# Patient Record
Sex: FEMALE | URBAN - METROPOLITAN AREA
[De-identification: names, ages, dates, MRNs, and addresses within clinical notes are randomized per-mention and may not be internally consistent; named-entity substitution may affect disease eponyms.]

---

## 2022-07-14 ENCOUNTER — TELEPHONE (OUTPATIENT)
Dept: BARIATRICS/WEIGHT MGMT | Age: 57
End: 2022-07-14

## 2022-07-14 NOTE — TELEPHONE ENCOUNTER
Self referral to weight loss office called to see if followed up with pcp ortho for knee pain and if we can obtain records for billable dx.  No answer left message to return call if still interested in program .

## 2023-09-12 ENCOUNTER — HOSPITAL ENCOUNTER (OUTPATIENT)
Dept: DATA CONVERSION | Facility: HOSPITAL | Age: 58
Discharge: HOME | End: 2023-09-12

## 2023-09-12 DIAGNOSIS — Z01.419 ENCOUNTER FOR GYNECOLOGICAL EXAMINATION (GENERAL) (ROUTINE) WITHOUT ABNORMAL FINDINGS: ICD-10-CM

## 2023-11-15 ENCOUNTER — APPOINTMENT (OUTPATIENT)
Dept: PRIMARY CARE | Facility: CLINIC | Age: 58
End: 2023-11-15
Payer: COMMERCIAL

## 2024-12-21 ENCOUNTER — OFFICE VISIT (OUTPATIENT)
Dept: URGENT CARE | Age: 59
End: 2024-12-21
Payer: COMMERCIAL

## 2024-12-21 VITALS
SYSTOLIC BLOOD PRESSURE: 135 MMHG | DIASTOLIC BLOOD PRESSURE: 86 MMHG | TEMPERATURE: 98.1 F | WEIGHT: 185.63 LBS | HEART RATE: 85 BPM | RESPIRATION RATE: 18 BRPM | OXYGEN SATURATION: 97 % | BODY MASS INDEX: 29.07 KG/M2

## 2024-12-21 DIAGNOSIS — Z20.822 SUSPECTED COVID-19 VIRUS INFECTION: ICD-10-CM

## 2024-12-21 DIAGNOSIS — J04.0 LARYNGITIS: Primary | ICD-10-CM

## 2024-12-21 DIAGNOSIS — R68.89 FLU-LIKE SYMPTOMS: ICD-10-CM

## 2024-12-21 LAB
POC RAPID INFLUENZA A: NEGATIVE
POC RAPID INFLUENZA B: NEGATIVE
POC RAPID STREP: NEGATIVE
POC SARS-COV-2 AG BINAX: NORMAL

## 2024-12-21 RX ORDER — PHENTERMINE HYDROCHLORIDE 37.5 MG/1
37.5 CAPSULE ORAL
COMMUNITY
Start: 2024-09-30 | End: 2024-12-29

## 2024-12-21 ASSESSMENT — ENCOUNTER SYMPTOMS
FEVER: 0
VOICE CHANGE: 1
VOMITING: 0
COUGH: 0
DIARRHEA: 0
FATIGUE: 0
DIZZINESS: 0
FLANK PAIN: 0
SINUS PRESSURE: 0
ABDOMINAL PAIN: 0
CHEST TIGHTNESS: 0
CHILLS: 0
SHORTNESS OF BREATH: 0
SORE THROAT: 0

## 2024-12-21 ASSESSMENT — VISUAL ACUITY: OU: 1

## 2024-12-21 NOTE — PATIENT INSTRUCTIONS
Discharge instructions    Please follow up with your Primary Care Physician within the next 5-7 days.    Likely acute viral laryngitis.  Would recommend warm salt water gargles.  If does not improve over the next week please follow-up with your primary care physician as above.  Low risk for oropharyngeal cancer without smoking. Your COVID, flu, strep testing is negative.    It is important to take prescriptions as prescribed and complete all antibiotics.     If your symptoms worsen you are instructed to immediately go to the emergency room for reevaluation and further assessment.    If you develop any chest pain, SOB, or difficulty breathing you are instructed to go to the emergency room for reevaluation.    All discharge instructions will be provided and explained to the patient at discharge.    If you have any questions regarding your treatment plan please call the [facility] urgent care clinic.

## 2024-12-21 NOTE — PROGRESS NOTES
"Subjective   Patient ID: Denise Wasserman is a 59 y.o. female. They present today with a chief complaint of Illness (Losing voice, 5 days ago getting worse. ).    History of Present Illness  Patient is a 59-year-old female presenting to the clinic with complaints of \"laryngitis\".  Patient states she has had symptoms of laryngitis for the last 4 to 5 days.  Patient denies sore throat.  Patient denies chest pain.  Patient denies shortness of breath.  Patient denies nasal congestion or sinus pressure.  Patient denies ear pain or ear pressure.  Patient states there is mild intermittent dry cough every once in a while.  Otherwise her main complaint is laryngitis.  No other acute ROS at this time.  Patient states she is meeting her family tomorrow for [place].  Patient would like to make sure she does not have anything significantly contagious.      History provided by:  Patient  Illness  Associated symptoms: no abdominal pain, no chest pain, no congestion, no cough, no diarrhea, no ear pain, no fatigue, no fever, no shortness of breath, no sore throat and no vomiting        Past Medical History  Allergies as of 12/21/2024    (No Known Allergies)       (Not in a hospital admission)       No past medical history on file.    Past Surgical History:   Procedure Laterality Date    BI US GUIDED BREAST LOCALIZATION AND BIOPSY LEFT Left 3/13/2014    BI US GUIDED BREAST LOCALIZATION AND BIOPSY LEFT [facility]            Review of Systems  Review of Systems   Constitutional:  Negative for chills, fatigue and fever.   HENT:  Positive for voice change. Negative for congestion, ear discharge, ear pain, postnasal drip, sinus pressure and sore throat.    Respiratory:  Negative for cough, chest tightness and shortness of breath.    Cardiovascular:  Negative for chest pain.   Gastrointestinal:  Negative for abdominal pain, diarrhea and vomiting.   Genitourinary:  Negative for flank pain.   Neurological:  Negative for " dizziness.                                  Objective    Vitals:    12/21/24 1225   BP: 135/86   BP Location: Left arm   Patient Position: Sitting   BP Cuff Size: Adult   Pulse: 85   Resp: 18   Temp: 36.7 °C (98.1 °F)   TempSrc: Oral   SpO2: 97%   Weight: 84.2 kg (185 lb 10 oz)     No LMP recorded. Patient is postmenopausal.    Physical Exam  Vitals reviewed.   Constitutional:       General: She is awake. She is not in acute distress.     Appearance: Normal appearance. She is well-developed. She is not ill-appearing or toxic-appearing.   HENT:      Head: Normocephalic and atraumatic.      Right Ear: Tympanic membrane, ear canal and external ear normal.      Left Ear: Tympanic membrane, ear canal and external ear normal.      Nose: No congestion.      Mouth/Throat:      Mouth: Mucous membranes are moist.      Pharynx: Oropharynx is clear. Posterior oropharyngeal erythema present. No pharyngeal swelling, oropharyngeal exudate or uvula swelling.      Tonsils: No tonsillar exudate or tonsillar abscesses.   Eyes:      General: Vision grossly intact.      Conjunctiva/sclera: Conjunctivae normal.   Cardiovascular:      Rate and Rhythm: Normal rate and regular rhythm.      Heart sounds: Normal heart sounds, S1 normal and S2 normal. No murmur heard.     No friction rub. No gallop.   Pulmonary:      Effort: Pulmonary effort is normal. No respiratory distress.      Breath sounds: Normal breath sounds. No stridor. No wheezing, rhonchi or rales.   Skin:     General: Skin is warm.   Neurological:      General: No focal deficit present.      Mental Status: She is alert and oriented to person, place, and time. Mental status is at baseline.      Gait: Gait is intact.   Psychiatric:         Mood and Affect: Mood normal.         Behavior: Behavior normal. Behavior is cooperative.         Procedures    Point of Care Test & Imaging Results from this visit  Results for orders placed or performed in visit on 12/21/24   POCT Covid-19 Rapid  "Antigen   Result Value Ref Range    POC AGGIE-COV-2 AG  Presumptive negative test for SARS-CoV-2 (no antigen detected)     Presumptive negative test for SARS-CoV-2 (no antigen detected)   POCT Influenza A/B manually resulted   Result Value Ref Range    POC Rapid Influenza A Negative Negative    POC Rapid Influenza B Negative Negative   POCT rapid strep A manually resulted   Result Value Ref Range    POC Rapid Strep Negative Negative      No results found.    Diagnostic study results (if any) were reviewed by [facility].    Assessment/Plan   Allergies, medications, history, and pertinent labs/EKGs/Imaging reviewed by Lowell Duran PA-C.     Medical Decision Making:     Patient is a 59-year-old female presenting to the clinic with complaints of \"laryngitis\".  Patient states she has had symptoms of laryngitis for the last 4 to 5 days.  Patient denies sore throat.  Patient denies chest pain.  Patient denies shortness of breath.  Patient denies nasal congestion or sinus pressure.  Patient denies ear pain or ear pressure.  Patient states there is mild intermittent dry cough every once in a while.  Otherwise her main complaint is laryngitis.  No other acute ROS at this time.  Patient states she is meeting her family tomorrow for [place].  Patient would like to make sure she does not have anything significantly contagious.  Vital signs in the clinic are stable.  Physical examination with mild erythema of the pharynx.  Otherwise no other abnormalities.  Likely viral laryngitis/pharyngitis. Discharge instructions. Please follow up with your Primary Care Physician within the next 5-7 days. Likely acute viral laryngitis.  Would recommend warm salt water gargles.  If does not improve over the next week please follow-up with your primary care physician as above.  Low risk for oropharyngeal cancer without smoking however needs to follow up if not improving. Your COVID, flu, strep testing is negative. It is important to " take prescriptions as prescribed and complete all antibiotics. If your symptoms worsen you are instructed to immediately go to the emergency room for reevaluation and further assessment. If you develop any chest pain, SOB, or difficulty breathing you are instructed to go to the emergency room for reevaluation. All discharge instructions will be provided and explained to the patient at discharge. If you have any questions regarding your treatment plan please call the [facility] urgent care clinic.     Orders and Diagnoses  Diagnoses and all orders for this visit:  Suspected COVID-19 virus infection  -     POCT Covid-19 Rapid Antigen  -     POCT Influenza A/B manually resulted  -     POCT rapid strep A manually resulted  Flu-like symptoms  -     POCT Covid-19 Rapid Antigen  -     POCT Influenza A/B manually resulted  -     POCT rapid strep A manually resulted      Medical Admin Record      Patient disposition: Home    Electronically signed by [facility]  12:54 PM

## 2025-01-28 ENCOUNTER — APPOINTMENT (OUTPATIENT)
Dept: PRIMARY CARE | Facility: CLINIC | Age: 60
End: 2025-01-28
Payer: COMMERCIAL

## 2025-01-28 ENCOUNTER — TELEPHONE (OUTPATIENT)
Dept: PRIMARY CARE | Facility: CLINIC | Age: 60
End: 2025-01-28

## 2025-01-28 VITALS
HEART RATE: 96 BPM | OXYGEN SATURATION: 94 % | HEIGHT: 67 IN | WEIGHT: 182 LBS | RESPIRATION RATE: 16 BRPM | DIASTOLIC BLOOD PRESSURE: 92 MMHG | BODY MASS INDEX: 28.56 KG/M2 | SYSTOLIC BLOOD PRESSURE: 120 MMHG

## 2025-01-28 DIAGNOSIS — R00.2 PALPITATIONS: ICD-10-CM

## 2025-01-28 DIAGNOSIS — R03.0 ELEVATED BLOOD PRESSURE READING WITHOUT DIAGNOSIS OF HYPERTENSION: ICD-10-CM

## 2025-01-28 DIAGNOSIS — H91.93 DECREASED HEARING OF BOTH EARS: Primary | ICD-10-CM

## 2025-01-28 DIAGNOSIS — M79.671 BILATERAL FOOT PAIN: ICD-10-CM

## 2025-01-28 DIAGNOSIS — Z00.00 ANNUAL PHYSICAL EXAM: ICD-10-CM

## 2025-01-28 DIAGNOSIS — M16.12 PRIMARY OSTEOARTHRITIS OF LEFT HIP: ICD-10-CM

## 2025-01-28 DIAGNOSIS — M79.672 BILATERAL FOOT PAIN: ICD-10-CM

## 2025-01-28 PROBLEM — N60.09 SOLITARY CYST OF BREAST: Status: ACTIVE | Noted: 2025-01-28

## 2025-01-28 PROBLEM — F43.21 SITUATIONAL DEPRESSION: Status: ACTIVE | Noted: 2020-12-28

## 2025-01-28 PROCEDURE — 1036F TOBACCO NON-USER: CPT | Performed by: FAMILY MEDICINE

## 2025-01-28 PROCEDURE — 3008F BODY MASS INDEX DOCD: CPT | Performed by: FAMILY MEDICINE

## 2025-01-28 PROCEDURE — 99204 OFFICE O/P NEW MOD 45 MIN: CPT | Performed by: FAMILY MEDICINE

## 2025-01-28 ASSESSMENT — PATIENT HEALTH QUESTIONNAIRE - PHQ9
2. FEELING DOWN, DEPRESSED OR HOPELESS: NOT AT ALL
SUM OF ALL RESPONSES TO PHQ9 QUESTIONS 1 AND 2: 0
1. LITTLE INTEREST OR PLEASURE IN DOING THINGS: NOT AT ALL

## 2025-01-28 ASSESSMENT — PAIN SCALES - GENERAL: PAINLEVEL_OUTOF10: 0-NO PAIN

## 2025-01-28 NOTE — ASSESSMENT & PLAN NOTE
No abnormalities noted on exam.  The fact that her pain occurs only with certain shoes suggest that perhaps the shoe was the problem and she should try avoiding shoes that bother her feet.  If this does not help with her symptoms persist would refer to podiatry.   Supplies order faxed.

## 2025-01-28 NOTE — ASSESSMENT & PLAN NOTE
Exam is fairly normal and she gets good control with Tylenol so we will continue with this.  Will encourage weight loss to put less pressure on the hip.  If hip pain becomes more prominent could refer to physical therapy.

## 2025-01-28 NOTE — TELEPHONE ENCOUNTER
FT - CPE - 9/9/2025  Please call patient if she needs additional blood work prior to this appointment.

## 2025-01-28 NOTE — PROGRESS NOTES
"Subjective   Patient ID: MENDEZ Wasserman is a 59 y.o. female who presents for Weight Check (New patient is here to discuss hip arthritis, hearing test and weight gain).    HPI   She is here for left hip pain.  She was diagnosed couple years ago with an x-ray with mild arthritis.  It bothers her off and on.  She was given prescription arthritis medication but she did not want to take it.  She takes Tylenol and it seems to keep it under control.  She is concerned that her increased weight may be putting more pressure on the hip.    2.  She is also here for concerns about being overweight.  She had been on phentermine for previous physician but it was not working and made her feel odd so she stopped taking it.  She has tried various diets without much success.    3.  She is interested in ENT referral for hearing loss.  She is notes hearing loss over the past couple years.  No ear pain.    4.  She is here for bilateral foot pain.  She is noticed over the past several months.  She has pain when wearing certain pairs of shoes.  When she wears open toed shoes or wears her bare feet or stocking at home she has no pain at all.  Review of Systems  Denies headache, blurred vision, chest pain, shortness of breath, nausea or vomiting, change in bowel habits or leg pain or swelling.    Objective   BP (!) 120/92 (BP Location: Left arm, Patient Position: Sitting, BP Cuff Size: Large adult)   Pulse 96   Resp 16   Ht 1.702 m (5' 7\")   Wt 82.6 kg (182 lb)   SpO2 94%   BMI 28.51 kg/m²     Physical Exam  Vitals and nurse's notes reviewed  General: no acute distress  HEENT: Normal  Neck: Supple  Lungs: Clear  Cardio: RRR w/o murmur  Abdomen: Soft, nontender, no hepatosplenomegaly  Extremities: No edema, no calf tenderness.  No foot abnormalities.  No tenderness of the feet.  Left hip with full range of motion no pain.  Neuro: Alert and oriented with no focal deficits    Assessment/Plan   Problem List Items Addressed This Visit         "     ICD-10-CM       Medium    Elevated blood pressure reading without diagnosis of hypertension R03.0     Will return for CMP CBC lipid panel and urinalysis.  I will notify her results.  Diabetes runs in her family and if glucose is high would consider starting on semaglutide for weight loss and sugar control.  If sugars normal would consider suggesting referral to [facility] for semaglutide for weight loss.         Relevant Orders    Lipid panel    Comprehensive metabolic panel    CBC and Auto Differential    Urinalysis with Reflex Microscopic    Palpitations R00.2    Decreased hearing of both ears - Primary H91.93     Will refer to ENT for further evaluation.         Relevant Orders    Referral to ENT    Primary osteoarthritis of left hip M16.12     Exam is fairly normal and she gets good control with Tylenol so we will continue with this.  Will encourage weight loss to put less pressure on the hip.  If hip pain becomes more prominent could refer to physical therapy.         Bilateral foot pain M79.671, M79.672     No abnormalities noted on exam.  The fact that her pain occurs only with certain shoes suggest that perhaps the shoe was the problem and she should try avoiding shoes that bother her feet.  If this does not help with her symptoms persist would refer to podiatry.

## 2025-01-28 NOTE — ASSESSMENT & PLAN NOTE
Will return for CMP CBC lipid panel and urinalysis.  I will notify her results.  Diabetes runs in her family and if glucose is high would consider starting on semaglutide for weight loss and sugar control.  If sugars normal would consider suggesting referral to [facility] for semaglutide for weight loss.

## 2025-02-06 LAB
ALBUMIN SERPL-MCNC: 4.4 G/DL (ref 3.6–5.1)
ALP SERPL-CCNC: 136 U/L (ref 37–153)
ALT SERPL-CCNC: 16 U/L (ref 6–29)
ANION GAP SERPL CALCULATED.4IONS-SCNC: 8 MMOL/L (CALC) (ref 7–17)
APPEARANCE UR: CLEAR
AST SERPL-CCNC: 14 U/L (ref 10–35)
BACTERIA #/AREA URNS HPF: ABNORMAL /HPF
BASOPHILS # BLD AUTO: 59 CELLS/UL (ref 0–200)
BASOPHILS NFR BLD AUTO: 1 %
BILIRUB SERPL-MCNC: 0.4 MG/DL (ref 0.2–1.2)
BILIRUB UR QL STRIP: NEGATIVE
BUN SERPL-MCNC: 12 MG/DL (ref 7–25)
CALCIUM SERPL-MCNC: 9.2 MG/DL (ref 8.6–10.4)
CHLORIDE SERPL-SCNC: 102 MMOL/L (ref 98–110)
CHOLEST SERPL-MCNC: 195 MG/DL
CHOLEST/HDLC SERPL: 3.1 (CALC)
CO2 SERPL-SCNC: 30 MMOL/L (ref 20–32)
COLOR UR: YELLOW
CREAT SERPL-MCNC: 0.65 MG/DL (ref 0.5–1.03)
EGFRCR SERPLBLD CKD-EPI 2021: 103 ML/MIN/1.73M2
EOSINOPHIL # BLD AUTO: 130 CELLS/UL (ref 15–500)
EOSINOPHIL NFR BLD AUTO: 2.2 %
ERYTHROCYTE [DISTWIDTH] IN BLOOD BY AUTOMATED COUNT: 11.9 % (ref 11–15)
GLUCOSE SERPL-MCNC: 84 MG/DL (ref 65–99)
GLUCOSE UR QL STRIP: NEGATIVE
HCT VFR BLD AUTO: 46.9 % (ref 35–45)
HDLC SERPL-MCNC: 62 MG/DL
HGB BLD-MCNC: 15.3 G/DL (ref 11.7–15.5)
HGB UR QL STRIP: NEGATIVE
HYALINE CASTS #/AREA URNS LPF: ABNORMAL /LPF
KETONES UR QL STRIP: NEGATIVE
LDLC SERPL CALC-MCNC: 116 MG/DL (CALC)
LEUKOCYTE ESTERASE UR QL STRIP: ABNORMAL
LYMPHOCYTES # BLD AUTO: 1723 CELLS/UL (ref 850–3900)
LYMPHOCYTES NFR BLD AUTO: 29.2 %
MCH RBC QN AUTO: 29.7 PG (ref 27–33)
MCHC RBC AUTO-ENTMCNC: 32.6 G/DL (ref 32–36)
MCV RBC AUTO: 91.1 FL (ref 80–100)
MONOCYTES # BLD AUTO: 454 CELLS/UL (ref 200–950)
MONOCYTES NFR BLD AUTO: 7.7 %
NEUTROPHILS # BLD AUTO: 3534 CELLS/UL (ref 1500–7800)
NEUTROPHILS NFR BLD AUTO: 59.9 %
NITRITE UR QL STRIP: NEGATIVE
NONHDLC SERPL-MCNC: 133 MG/DL (CALC)
PH UR STRIP: 7 [PH] (ref 5–8)
PLATELET # BLD AUTO: 342 THOUSAND/UL (ref 140–400)
PMV BLD REES-ECKER: 10.2 FL (ref 7.5–12.5)
POTASSIUM SERPL-SCNC: 4.3 MMOL/L (ref 3.5–5.3)
PROT SERPL-MCNC: 7.2 G/DL (ref 6.1–8.1)
PROT UR QL STRIP: NEGATIVE
RBC # BLD AUTO: 5.15 MILLION/UL (ref 3.8–5.1)
RBC #/AREA URNS HPF: ABNORMAL /HPF
SERVICE CMNT-IMP: ABNORMAL
SODIUM SERPL-SCNC: 140 MMOL/L (ref 135–146)
SP GR UR STRIP: 1.01 (ref 1–1.03)
SQUAMOUS #/AREA URNS HPF: ABNORMAL /HPF
TRIGL SERPL-MCNC: 80 MG/DL
WBC # BLD AUTO: 5.9 THOUSAND/UL (ref 3.8–10.8)
WBC #/AREA URNS HPF: ABNORMAL /HPF

## 2025-03-04 ENCOUNTER — APPOINTMENT (OUTPATIENT)
Dept: AUDIOLOGY | Facility: CLINIC | Age: 60
End: 2025-03-04
Payer: COMMERCIAL

## 2025-03-04 ENCOUNTER — APPOINTMENT (OUTPATIENT)
Dept: OTOLARYNGOLOGY | Facility: CLINIC | Age: 60
End: 2025-03-04
Payer: COMMERCIAL

## 2025-03-04 VITALS — WEIGHT: 180 LBS | HEIGHT: 67 IN | BODY MASS INDEX: 28.25 KG/M2

## 2025-03-04 DIAGNOSIS — H90.5 SENSORINEURAL HEARING LOSS (SNHL), UNSPECIFIED LATERALITY: Primary | ICD-10-CM

## 2025-03-04 DIAGNOSIS — H91.93 DECREASED HEARING OF BOTH EARS: ICD-10-CM

## 2025-03-04 DIAGNOSIS — H90.3 SENSORINEURAL HEARING LOSS (SNHL) OF BOTH EARS: Primary | ICD-10-CM

## 2025-03-04 PROCEDURE — 92557 COMPREHENSIVE HEARING TEST: CPT | Performed by: AUDIOLOGIST

## 2025-03-04 PROCEDURE — 3008F BODY MASS INDEX DOCD: CPT

## 2025-03-04 PROCEDURE — 1036F TOBACCO NON-USER: CPT

## 2025-03-04 PROCEDURE — 92550 TYMPANOMETRY & REFLEX THRESH: CPT | Performed by: AUDIOLOGIST

## 2025-03-04 PROCEDURE — 99202 OFFICE O/P NEW SF 15 MIN: CPT

## 2025-03-04 NOTE — PROGRESS NOTES
"Chief Complaint   Patient presents with    New Patient Visit     NP- HEARING LOSS      HPI:  Denise Wasserman is a 59 y.o. female reports decreased hearing over the past couple years per her spouse.  He complains that she has to have the volume up to loud on the TV or radio.  She reports a family history older sister needing hearing aids in both parents with hearing loss.  She tinnitus, pressure, otalgia or otorrhea.  We discussed today's audiogram right ear moderate loss at 8000hz and the left mild sensorineural hearing loss 6000 Hz and above.  Tympanogram type A with excellent word perception bilaterally.      PMH:  Past Medical History:   Diagnosis Date    Arthritis left hip 2024    Cataract     HL (hearing loss) need checked     Past Surgical History:   Procedure Laterality Date    BI US GUIDED BREAST LOCALIZATION AND BIOPSY LEFT Left 03/13/2014    BI US GUIDED BREAST LOCALIZATION AND BIOPSY LEFT [facility]    CHOLECYSTECTOMY  not sure    EYE SURGERY  cateracs removed implants in         Medications:     Current Outpatient Medications:     BIOTIN ORAL, Take by mouth., Disp: , Rfl:     MULTIVITAMIN ORAL, Take by mouth., Disp: , Rfl:     phentermine 37.5 mg capsule, Take 37.5 mg by mouth., Disp: , Rfl:      Allergies:  No Known Allergies     ROS:  Review of systems normal unless stated otherwise in the HPI and/or PMH.    Physical Exam:  Height 1.702 m (5' 7\"), weight 81.6 kg (180 lb). Body mass index is 28.19 kg/m².     GENERAL APPEARANCE: Well developed and well nourished.  Alert and oriented in no acute distress.  Normal vocal quality.      HEAD/FACE: No erythema or edema or facial tenderness.  Normal facial nerve function bilaterally.    EAR:       EXTERNAL: Normal pinnas and external auditory canals without lesion or obstructing wax.       MIDDLE EAR: Tympanic membranes intact and mobile with normal landmarks.  Middle ear space appears well aerated.       TUBE STATUS: N/A       MASTOID CAVITY: " "N/A       HEARING: Gross hearing assessment is within normal limits.      NOSE:       VISUALIZED USING: Anterior rhinoscopy with headlight and nasal speculum.       DORSUM: Midline, nontraumatic appearance.       MUCOSA: Normal-appearing.       SECRETIONS: Normal.       SEPTUM: Midline and nonobstructing.       INFERIOR TURBINATES: Normal.       MIDDLE TURBINATES/MEATUS: N/A       BLEEDING: N/A         ORAL CAVITY/PHARYNX:       TEETH: Adequate dentition.       TONGUE: No mass or lesion.  Normal mobility.       FLOOR OF MOUTH: No mass or lesion.       PALATE: Normal hard palate, soft palate, and uvula.       OROPHARYNX: Normal without mass or lesion.       BUCCAL MUCOSA/GBS: Normal without mass or lesion.       LIPS: Normal.    LARYNX/HYPOPHARYNX/NASOPHARYNX: N/A    NECK: No palpable masses or abnormal adenopathy.  Trachea is midline.    THYROID: No thyromegaly or palpable nodule.    SALIVARY GLANDS: Normal bilateral parotid and submandibular glands by inspection and palpation.    TMJ's: Normal.    NEURO: Cranial nerve exam grossly normal bilaterally.       Assessment/Plan   Denise \"CARIN" was seen today for new patient visit.  Diagnoses and all orders for this visit:  Sensorineural hearing loss (SNHL), unspecified laterality (Primary)  Decreased hearing of both ears  -     Referral to ENT     Lexi and I discussed her normal exam today and communication techniques.  She will return as needed and in 1 year with an audiogram.  No follow-ups on file.     Pat Mazariegos, MIGUEL-CNP      "

## 2025-03-04 NOTE — PROGRESS NOTES
"  AUDIOLOGY ADULT AUDIOMETRIC EVALUATION    Name:  Denise Wasserman  :  1965  Age:  59 y.o.  Date of Evaluation:  2025    Reason for visit: \"Manuel"  is seen in the clinic today at the request of otolaryngology for an audiologic evaluation.     HISTORY  Patient reports her  has noticed the T.V. and radio volume has been turned up lately.   She will sometimes \"miss\" what is said; especially when someone is in another room.   She reports family history of hearing loss; her sister has hearing aids (she is 10 years older than her); and both her parents have some hearing loss.  She does not notice any significant difficulty hearing in most environments.   She denies any ringing, fullness, pressure.   No dizziness/imbalance reported.      EVALUATION  See scanned audiogram: “Media” > “Audiology Report”.      RESULTS  Otoscopic Evaluation:  Right Ear: clear ear canal  Left Ear: clear ear canal    Immittance Measures:  Tympanometry:  Right Ear: Type A, normal tympanic membrane mobility with normal middle ear pressure  Left Ear: Type A, normal tympanic membrane mobility with normal middle ear pressure     Acoustic Reflexes:  Ipsilateral Right Ear: Acoustic reflexes present within normal limits 500Hz through 4KHz   Ipsilateral Left Ear: Acoustic reflexes present within normal limits 500Hz through 4KHz   Contralateral Right Ear: did not evaluate  Contralateral Left Ear: did not evaluate    Distortion Product Otoacoustic Emissions (DPOAEs):  Right Ear:  Passed 1000 Hz, 1500 Hz, 5000 Hz; refer at all other frequencies  Left Ear: Passed 1500 Hz, 3000 Hz, 5000 Hz; refer at all other frequencies      Audiometry:  Test Technique and Reliability:   Standard audiometry via supra-aural headphones. Reliability is good.    Pure tone air and bone conduction audiometry:  Right Ear:  Borderline normal hearing sensitivity at 4000 Hz; moderate at 8000 Hz   Left Ear: Mild hearing loss at 6000 Hz and above     Speech " Audiometry (Word Recognition Scores):   Right Ear: Excellent  Left Ear: Excellent     IMPRESSIONS    Right ear: moderate loss at 8000 Hz  Left ear: mild sensorineural hearing loss 6000 Hz and above    RECOMMENDATIONS  - Follow up with otolaryngology today as scheduled.  - Audiologic evaluation to monitor.     - Discussed communication strategies.      PATIENT EDUCATION  Discussed results, impressions and recommendations with the patient. Questions were addressed and the patient was encouraged to contact our office should concerns arise.    Time for this encounter: 320/355    Alexandrea Kumar M.A., CCC/A   Licensed Audiologist

## 2025-03-13 ENCOUNTER — OFFICE VISIT (OUTPATIENT)
Dept: PRIMARY CARE | Facility: CLINIC | Age: 60
End: 2025-03-13
Payer: COMMERCIAL

## 2025-03-13 VITALS
SYSTOLIC BLOOD PRESSURE: 112 MMHG | BODY MASS INDEX: 28.19 KG/M2 | HEART RATE: 72 BPM | OXYGEN SATURATION: 97 % | WEIGHT: 180 LBS | DIASTOLIC BLOOD PRESSURE: 84 MMHG | RESPIRATION RATE: 16 BRPM

## 2025-03-13 DIAGNOSIS — F41.9 ANXIETY AND DEPRESSION: Primary | ICD-10-CM

## 2025-03-13 DIAGNOSIS — F32.A ANXIETY AND DEPRESSION: Primary | ICD-10-CM

## 2025-03-13 PROCEDURE — 99214 OFFICE O/P EST MOD 30 MIN: CPT | Performed by: FAMILY MEDICINE

## 2025-03-13 PROCEDURE — 1036F TOBACCO NON-USER: CPT | Performed by: FAMILY MEDICINE

## 2025-03-13 RX ORDER — SERTRALINE HYDROCHLORIDE 50 MG/1
50 TABLET, FILM COATED ORAL DAILY
Qty: 30 TABLET | Refills: 5 | Status: SHIPPED | OUTPATIENT
Start: 2025-03-13 | End: 2025-09-09

## 2025-03-13 ASSESSMENT — PATIENT HEALTH QUESTIONNAIRE - PHQ9
7. TROUBLE CONCENTRATING ON THINGS, SUCH AS READING THE NEWSPAPER OR WATCHING TELEVISION: NEARLY EVERY DAY
10. IF YOU CHECKED OFF ANY PROBLEMS, HOW DIFFICULT HAVE THESE PROBLEMS MADE IT FOR YOU TO DO YOUR WORK, TAKE CARE OF THINGS AT HOME, OR GET ALONG WITH OTHER PEOPLE: EXTREMELY DIFFICULT
6. FEELING BAD ABOUT YOURSELF - OR THAT YOU ARE A FAILURE OR HAVE LET YOURSELF OR YOUR FAMILY DOWN: MORE THAN HALF THE DAYS
SUM OF ALL RESPONSES TO PHQ9 QUESTIONS 1 AND 2: 6
9. THOUGHTS THAT YOU WOULD BE BETTER OFF DEAD, OR OF HURTING YOURSELF: NOT AT ALL
2. FEELING DOWN, DEPRESSED OR HOPELESS: NEARLY EVERY DAY
SUM OF ALL RESPONSES TO PHQ QUESTIONS 1-9: 17
8. MOVING OR SPEAKING SO SLOWLY THAT OTHER PEOPLE COULD HAVE NOTICED. OR THE OPPOSITE, BEING SO FIGETY OR RESTLESS THAT YOU HAVE BEEN MOVING AROUND A LOT MORE THAN USUAL: NOT AT ALL
3. TROUBLE FALLING OR STAYING ASLEEP OR SLEEPING TOO MUCH: NEARLY EVERY DAY
4. FEELING TIRED OR HAVING LITTLE ENERGY: NEARLY EVERY DAY
1. LITTLE INTEREST OR PLEASURE IN DOING THINGS: NEARLY EVERY DAY
5. POOR APPETITE OR OVEREATING: NOT AT ALL

## 2025-03-13 ASSESSMENT — PAIN SCALES - GENERAL: PAINLEVEL_OUTOF10: 0-NO PAIN

## 2025-03-13 NOTE — PROGRESS NOTES
Subjective   Patient ID: CAROLYN Wasserman is a 59 y.o. female who presents for No chief complaint on file..    HPI   Carolyn is a 59 year old female with a significant PMH of anxiety and depression who presents to discuss mental health medication. She has previously been on wellbutrin and zoloft.     Review of Systems    Objective   There were no vitals taken for this visit.    Physical Exam    Assessment/Plan   Assessment & Plan

## 2025-03-13 NOTE — PROGRESS NOTES
Subjective   Patient ID: MENDEZ Wasserman is a 59 y.o. female who presents for Consult (Patient is here to discuss going on medication for mental health ).    HPI   She is here for anxiety/depression.  She works in a [employer] firm and about a month ago was sent an email from a client that was a video of him masturbating.  She went to her manager and the client has been kicked out of their company.  They went to the police but no charges can be filed.  She has been very upset and has been unable to function at work.  Whenever she goes to work to go to [employer] she gets upset and stressed out.  She had continue working to this past week when she took time off.  She states she is fine at home and knows no problems with anxiety or stressful symptoms.  Is only when she goes to work and has to work on a computer that she has aches feelings.  She did see a counselor through her work but did not find to be helpful.  She was referred to a psychiatrist for possible medication but she could not get in there for 3 weeks and she did not want to wait that long.  She denies any suicidal or homicidal ideation.  She is eating and sleeping okay at home.  Review of Systems  Denies headache, blurred vision, chest pain, shortness of breath, nausea or vomiting, change in bowel habits or leg pain or swelling.    Objective   /84 (BP Location: Left arm, Patient Position: Sitting, BP Cuff Size: Large adult)   Pulse 72   Resp 16   Wt 81.6 kg (180 lb)   SpO2 97%   BMI 28.19 kg/m²     Physical Exam    Assessment/Plan   Assessment & Plan  Anxiety and depression  We a long discussion regarding her symptoms.  We talked about counseling but she did not want to pursue this at this time.  Will start her on sertraline 50 mg daily and return to see me in 1 month.  In the meantime she has any issues she is to let me know.  I did let her know that the medication would not take full effect for at least a week or so.  If she changes her mind says  she wants to pursue counseling I would support this.  Orders:    sertraline (Zoloft) 50 mg tablet; Take 1 tablet (50 mg) by mouth once daily.

## 2025-03-18 DIAGNOSIS — Z12.31 ENCOUNTER FOR SCREENING MAMMOGRAM FOR BREAST CANCER: ICD-10-CM

## 2025-04-17 ENCOUNTER — APPOINTMENT (OUTPATIENT)
Dept: PRIMARY CARE | Facility: CLINIC | Age: 60
End: 2025-04-17
Payer: COMMERCIAL

## 2025-05-22 ENCOUNTER — OFFICE VISIT (OUTPATIENT)
Dept: PRIMARY CARE | Facility: CLINIC | Age: 60
End: 2025-05-22
Payer: COMMERCIAL

## 2025-05-22 VITALS
RESPIRATION RATE: 16 BRPM | OXYGEN SATURATION: 96 % | SYSTOLIC BLOOD PRESSURE: 116 MMHG | DIASTOLIC BLOOD PRESSURE: 60 MMHG | WEIGHT: 182 LBS | BODY MASS INDEX: 28.51 KG/M2 | HEART RATE: 98 BPM

## 2025-05-22 DIAGNOSIS — M25.511 ACUTE PAIN OF RIGHT SHOULDER: Primary | ICD-10-CM

## 2025-05-22 DIAGNOSIS — F43.21 SITUATIONAL DEPRESSION: ICD-10-CM

## 2025-05-22 DIAGNOSIS — F41.9 ANXIETY AND DEPRESSION: ICD-10-CM

## 2025-05-22 DIAGNOSIS — F32.A ANXIETY AND DEPRESSION: ICD-10-CM

## 2025-05-22 PROCEDURE — 1036F TOBACCO NON-USER: CPT | Performed by: FAMILY MEDICINE

## 2025-05-22 PROCEDURE — 99213 OFFICE O/P EST LOW 20 MIN: CPT | Performed by: FAMILY MEDICINE

## 2025-05-22 RX ORDER — CYCLOBENZAPRINE HCL 10 MG
10 TABLET ORAL 3 TIMES DAILY PRN
COMMUNITY
Start: 2025-05-16 | End: 2025-05-23

## 2025-05-22 RX ORDER — NAPROXEN 500 MG/1
500 TABLET ORAL
COMMUNITY
Start: 2025-05-16 | End: 2025-05-23

## 2025-05-22 RX ORDER — SERTRALINE HYDROCHLORIDE 50 MG/1
50 TABLET, FILM COATED ORAL DAILY
Qty: 90 TABLET | Refills: 3 | Status: SHIPPED | OUTPATIENT
Start: 2025-05-22 | End: 2026-05-22

## 2025-05-22 ASSESSMENT — PAIN SCALES - GENERAL: PAINLEVEL_OUTOF10: 10-WORST PAIN EVER

## 2025-05-22 NOTE — ASSESSMENT & PLAN NOTE
Continue sertraline.  She is pursuing counseling through her  at her [employer].  Follow-up with me as scheduled in 2 to 3 months.  Return sooner as needed.

## 2025-05-22 NOTE — ASSESSMENT & PLAN NOTE
Difficult to assess where this is a muscle shoulder strain such as the trapezius versus a bursitis versus nerve impingement.  At this point she is getting some relief from the Naprosyn she can try this or give a trial of ibuprofen.  Recommend using heat and range of motion exercises.  If no continued improvement over the next couple of weeks she is to let me know.

## 2025-05-22 NOTE — PROGRESS NOTES
Subjective   Patient ID: MENDEZ Wasserman is a 59 y.o. female who presents for Follow-up (Patient is here for a follow up for zoloft /Patient is here for right shoulder pain x 1 week).    HPI   She is here for follow-up of anxiety.  She was seen by me 2 months ago and was started on sertraline for anxiety/depression as a result from indecent exposure from a client at work.  This is outlined for her and her previous visit here.  Since starting the sertraline she is felt much better with less anxious feelings and less crying episodes.  She is having no problems taking the sertraline.      2.  She is also here for follow-up of shoulder pain she was seen on 5/16/2025 at urgent care for a 5-day history of right shoulder pain.  She was evaluated and treated with Naprosyn and Flexeril.  When taken Naprosyn she does get relief but it is only short-term.  She been taking that twice a day and the Flexeril at night.  Pain is at the top of her shoulder and sometimes radiates down her arm.  No numbness or tingling in her hand.  The pain often occurs at rest.  The pain does not prevent her from using her arm or shoulder.  Review of Systems  Denies headache, blurred vision, chest pain, shortness of breath, nausea or vomiting, change in bowel habits or leg pain or swelling.    Objective   /60 (BP Location: Left arm, Patient Position: Sitting, BP Cuff Size: Large adult)   Pulse 98   Resp 16   Wt 82.6 kg (182 lb)   SpO2 96%   BMI 28.51 kg/m²     Physical Exam  Vitals and nurse's notes reviewed  General: no acute distress  HEENT: Normal  Neck: Supple  Lungs: Clear  Cardio: RRR w/o murmur  Extremities: Right shoulder with full range of motion.  There is no point tenderness.  She has good handgrip strength.  Negative spilled can sign.  She points to the upper middle scapular area as where the pain is worse.  Neuro: Alert and oriented with no focal deficits    Assessment/Plan   Assessment & Plan  Anxiety and depression          Acute pain of right shoulder  Difficult to assess where this is a muscle shoulder strain such as the trapezius versus a bursitis versus nerve impingement.  At this point she is getting some relief from the Naprosyn she can try this or give a trial of ibuprofen.  Recommend using heat and range of motion exercises.  If no continued improvement over the next couple of weeks she is to let me know.         Situational depression  Continue sertraline.  She is pursuing counseling through her  at [facility] [employer].  Follow-up with me as scheduled in 2 to 3 months.  Return sooner as needed.

## 2025-07-08 ENCOUNTER — APPOINTMENT (OUTPATIENT)
Dept: PRIMARY CARE | Facility: CLINIC | Age: 60
End: 2025-07-08
Payer: COMMERCIAL

## 2025-07-08 ENCOUNTER — OFFICE VISIT (OUTPATIENT)
Dept: PRIMARY CARE | Facility: CLINIC | Age: 60
End: 2025-07-08
Payer: COMMERCIAL

## 2025-07-08 VITALS
HEART RATE: 89 BPM | SYSTOLIC BLOOD PRESSURE: 128 MMHG | HEIGHT: 67 IN | OXYGEN SATURATION: 96 % | BODY MASS INDEX: 28.56 KG/M2 | WEIGHT: 182 LBS | DIASTOLIC BLOOD PRESSURE: 88 MMHG

## 2025-07-08 DIAGNOSIS — F43.21 SITUATIONAL DEPRESSION: Primary | ICD-10-CM

## 2025-07-08 PROCEDURE — 1036F TOBACCO NON-USER: CPT | Performed by: FAMILY MEDICINE

## 2025-07-08 PROCEDURE — 99213 OFFICE O/P EST LOW 20 MIN: CPT | Performed by: FAMILY MEDICINE

## 2025-07-08 PROCEDURE — 3008F BODY MASS INDEX DOCD: CPT | Performed by: FAMILY MEDICINE

## 2025-07-08 ASSESSMENT — PATIENT HEALTH QUESTIONNAIRE - PHQ9
1. LITTLE INTEREST OR PLEASURE IN DOING THINGS: NOT AT ALL
SUM OF ALL RESPONSES TO PHQ9 QUESTIONS 1 AND 2: 0
2. FEELING DOWN, DEPRESSED OR HOPELESS: NOT AT ALL

## 2025-07-08 ASSESSMENT — PAIN SCALES - GENERAL: PAINLEVEL_OUTOF10: 0-NO PAIN

## 2025-07-08 NOTE — ASSESSMENT & PLAN NOTE
Continue sertraline.  Continue following with her  for counseling.  Recheck with me as scheduled in 2 months for CPE.

## 2025-07-08 NOTE — PROGRESS NOTES
"Subjective   Patient ID: MENDEZ Wasserman is a 59 y.o. female who presents for medication review.    HPI   He is here for follow-up of anxiety.  She is on sertraline as a result of an indecent exposure from a client at work that occurred in 2/25.  She has noted improvement in her symptoms since starting on the sertraline.  She feels the medication is helping quite a bit and has had no side effects.  She also is seeing her  every 2 weeks for counseling.  Review of Systems  Denies headache, blurred vision, chest pain, shortness of breath, nausea or vomiting, change in bowel habits or leg pain or swelling.    Objective   /88 (BP Location: Left arm, Patient Position: Sitting, BP Cuff Size: Adult)   Pulse 89   Ht 1.702 m (5' 7\")   Wt 82.6 kg (182 lb)   SpO2 96%   BMI 28.51 kg/m²     Physical Exam  Vitals and nurse's notes reviewed  General: no acute distress  HEENT: Normal  Neck: Supple  Lungs: Clear  Cardio: RRR w/o murmur  Extremities: No edema, no calf tenderness  Neuro: Alert and oriented with no focal deficits    Assessment/Plan   Assessment & Plan  Situational depression  Continue sertraline.  Continue following with her  for counseling.  Recheck with me as scheduled in 2 months for CPE.            "

## 2025-08-09 DIAGNOSIS — Z00.00 ANNUAL PHYSICAL EXAM: ICD-10-CM

## 2025-09-09 ENCOUNTER — APPOINTMENT (OUTPATIENT)
Dept: PRIMARY CARE | Facility: CLINIC | Age: 60
End: 2025-09-09
Payer: COMMERCIAL

## 2025-09-16 ENCOUNTER — APPOINTMENT (OUTPATIENT)
Dept: PRIMARY CARE | Facility: CLINIC | Age: 60
End: 2025-09-16
Payer: COMMERCIAL